# Patient Record
Sex: MALE | Race: WHITE | NOT HISPANIC OR LATINO | Employment: UNEMPLOYED | URBAN - NONMETROPOLITAN AREA
[De-identification: names, ages, dates, MRNs, and addresses within clinical notes are randomized per-mention and may not be internally consistent; named-entity substitution may affect disease eponyms.]

---

## 2019-11-15 ENCOUNTER — HOSPITAL ENCOUNTER (INPATIENT)
Facility: HOSPITAL | Age: 29
LOS: 1 days | Discharge: HOME | DRG: 885 | End: 2019-11-16
Attending: PHYSICIAN ASSISTANT | Admitting: PSYCHIATRY & NEUROLOGY
Payer: COMMERCIAL

## 2019-11-15 VITALS
RESPIRATION RATE: 16 BRPM | WEIGHT: 180 LBS | TEMPERATURE: 99.3 F | SYSTOLIC BLOOD PRESSURE: 152 MMHG | OXYGEN SATURATION: 99 % | DIASTOLIC BLOOD PRESSURE: 97 MMHG | BODY MASS INDEX: 23.75 KG/M2

## 2019-11-15 DIAGNOSIS — F32.A DEPRESSION WITH SUICIDAL IDEATION: ICD-10-CM

## 2019-11-15 DIAGNOSIS — F43.10 PTSD (POST-TRAUMATIC STRESS DISORDER): Primary | ICD-10-CM

## 2019-11-15 DIAGNOSIS — R45.851 SUICIDAL IDEATION: ICD-10-CM

## 2019-11-15 DIAGNOSIS — R45.851 DEPRESSION WITH SUICIDAL IDEATION: ICD-10-CM

## 2019-11-15 LAB
ALBUMIN SERPL-MCNC: 4.6 G/DL (ref 3.4–5)
ALP SERPL-CCNC: 84 U/L (ref 40–150)
ALT SERPL W P-5'-P-CCNC: 13 U/L (ref 0–70)
AMPHETAMINES UR QL: NOT DETECTED NG/ML
ANION GAP SERPL CALCULATED.3IONS-SCNC: 6 MMOL/L (ref 3–14)
APAP SERPL-MCNC: <2 MG/L (ref 10–20)
APTT PPP: 31 SEC (ref 22–37)
AST SERPL W P-5'-P-CCNC: 16 U/L (ref 0–45)
BARBITURATES UR QL SCN: NOT DETECTED NG/ML
BASOPHILS # BLD AUTO: 0 10E9/L (ref 0–0.2)
BASOPHILS NFR BLD AUTO: 0.2 %
BENZODIAZ UR QL SCN: NOT DETECTED NG/ML
BILIRUB SERPL-MCNC: 0.7 MG/DL (ref 0.2–1.3)
BUN SERPL-MCNC: 12 MG/DL (ref 7–30)
BUPRENORPHINE UR QL: NOT DETECTED NG/ML
CALCIUM SERPL-MCNC: 9.1 MG/DL (ref 8.5–10.1)
CANNABINOIDS UR QL: ABNORMAL NG/ML
CHLORIDE SERPL-SCNC: 106 MMOL/L (ref 94–109)
CO2 SERPL-SCNC: 26 MMOL/L (ref 20–32)
COCAINE UR QL SCN: NOT DETECTED NG/ML
CREAT SERPL-MCNC: 0.75 MG/DL (ref 0.66–1.25)
D-METHAMPHET UR QL: NOT DETECTED NG/ML
DIFFERENTIAL METHOD BLD: NORMAL
EOSINOPHIL # BLD AUTO: 0.1 10E9/L (ref 0–0.7)
EOSINOPHIL NFR BLD AUTO: 0.6 %
ERYTHROCYTE [DISTWIDTH] IN BLOOD BY AUTOMATED COUNT: 11.6 % (ref 10–15)
ETHANOL SERPL-MCNC: <0.01 G/DL
GFR SERPL CREATININE-BSD FRML MDRD: >90 ML/MIN/{1.73_M2}
GLUCOSE SERPL-MCNC: 84 MG/DL (ref 70–99)
HCT VFR BLD AUTO: 48.3 % (ref 40–53)
HGB BLD-MCNC: 16.9 G/DL (ref 13.3–17.7)
IMM GRANULOCYTES # BLD: 0 10E9/L (ref 0–0.4)
IMM GRANULOCYTES NFR BLD: 0.4 %
INR PPP: 1.12 (ref 0.86–1.14)
LYMPHOCYTES # BLD AUTO: 2.2 10E9/L (ref 0.8–5.3)
LYMPHOCYTES NFR BLD AUTO: 26.3 %
MCH RBC QN AUTO: 30.8 PG (ref 26.5–33)
MCHC RBC AUTO-ENTMCNC: 35 G/DL (ref 31.5–36.5)
MCV RBC AUTO: 88 FL (ref 78–100)
METHADONE UR QL SCN: NOT DETECTED NG/ML
MONOCYTES # BLD AUTO: 0.5 10E9/L (ref 0–1.3)
MONOCYTES NFR BLD AUTO: 6.3 %
NEUTROPHILS # BLD AUTO: 5.4 10E9/L (ref 1.6–8.3)
NEUTROPHILS NFR BLD AUTO: 66.2 %
NRBC # BLD AUTO: 0 10*3/UL
NRBC BLD AUTO-RTO: 0 /100
OPIATES UR QL SCN: NOT DETECTED NG/ML
OXYCODONE UR QL SCN: NOT DETECTED NG/ML
PCP UR QL SCN: NOT DETECTED NG/ML
PLATELET # BLD AUTO: 166 10E9/L (ref 150–450)
POTASSIUM SERPL-SCNC: 3.5 MMOL/L (ref 3.4–5.3)
PROPOXYPH UR QL: NOT DETECTED NG/ML
PROT SERPL-MCNC: 7.9 G/DL (ref 6.8–8.8)
RBC # BLD AUTO: 5.49 10E12/L (ref 4.4–5.9)
SALICYLATES SERPL-MCNC: 2 MG/DL
SODIUM SERPL-SCNC: 138 MMOL/L (ref 133–144)
TRICYCLICS UR QL SCN: NOT DETECTED NG/ML
TSH SERPL DL<=0.005 MIU/L-ACNC: 0.95 MU/L (ref 0.4–4)
WBC # BLD AUTO: 8.2 10E9/L (ref 4–11)

## 2019-11-15 PROCEDURE — 99285 EMERGENCY DEPT VISIT HI MDM: CPT | Mod: Z6 | Performed by: EMERGENCY MEDICINE

## 2019-11-15 PROCEDURE — 99285 EMERGENCY DEPT VISIT HI MDM: CPT

## 2019-11-15 PROCEDURE — 80306 DRUG TEST PRSMV INSTRMNT: CPT | Performed by: EMERGENCY MEDICINE

## 2019-11-15 PROCEDURE — 85730 THROMBOPLASTIN TIME PARTIAL: CPT | Performed by: EMERGENCY MEDICINE

## 2019-11-15 PROCEDURE — 85025 COMPLETE CBC W/AUTO DIFF WBC: CPT | Performed by: EMERGENCY MEDICINE

## 2019-11-15 PROCEDURE — 80320 DRUG SCREEN QUANTALCOHOLS: CPT | Performed by: EMERGENCY MEDICINE

## 2019-11-15 PROCEDURE — 36415 COLL VENOUS BLD VENIPUNCTURE: CPT | Performed by: EMERGENCY MEDICINE

## 2019-11-15 PROCEDURE — 80053 COMPREHEN METABOLIC PANEL: CPT | Performed by: EMERGENCY MEDICINE

## 2019-11-15 PROCEDURE — 80329 ANALGESICS NON-OPIOID 1 OR 2: CPT | Performed by: EMERGENCY MEDICINE

## 2019-11-15 PROCEDURE — 12400000 ZZH R&B MH

## 2019-11-15 PROCEDURE — 25000132 ZZH RX MED GY IP 250 OP 250 PS 637: Performed by: NURSE PRACTITIONER

## 2019-11-15 PROCEDURE — 85610 PROTHROMBIN TIME: CPT | Performed by: EMERGENCY MEDICINE

## 2019-11-15 PROCEDURE — 84443 ASSAY THYROID STIM HORMONE: CPT | Performed by: EMERGENCY MEDICINE

## 2019-11-15 RX ORDER — ALUMINA, MAGNESIA, AND SIMETHICONE 2400; 2400; 240 MG/30ML; MG/30ML; MG/30ML
30 SUSPENSION ORAL EVERY 4 HOURS PRN
Status: DISCONTINUED | OUTPATIENT
Start: 2019-11-15 | End: 2019-11-16 | Stop reason: HOSPADM

## 2019-11-15 RX ORDER — ACETAMINOPHEN 325 MG/1
650 TABLET ORAL EVERY 4 HOURS PRN
Status: DISCONTINUED | OUTPATIENT
Start: 2019-11-15 | End: 2019-11-16 | Stop reason: HOSPADM

## 2019-11-15 RX ORDER — HYDROXYZINE HYDROCHLORIDE 10 MG/1
30-50 TABLET, FILM COATED ORAL EVERY 4 HOURS PRN
Status: DISCONTINUED | OUTPATIENT
Start: 2019-11-15 | End: 2019-11-16 | Stop reason: HOSPADM

## 2019-11-15 RX ORDER — NICOTINE 21 MG/24HR
1 PATCH, TRANSDERMAL 24 HOURS TRANSDERMAL DAILY
Status: DISCONTINUED | OUTPATIENT
Start: 2019-11-15 | End: 2019-11-16 | Stop reason: HOSPADM

## 2019-11-15 RX ORDER — LORAZEPAM 1 MG/1
1 TABLET ORAL ONCE
Status: DISCONTINUED | OUTPATIENT
Start: 2019-11-15 | End: 2019-11-16 | Stop reason: HOSPADM

## 2019-11-15 RX ORDER — TRAZODONE HYDROCHLORIDE 50 MG/1
50 TABLET, FILM COATED ORAL
Status: DISCONTINUED | OUTPATIENT
Start: 2019-11-15 | End: 2019-11-16 | Stop reason: HOSPADM

## 2019-11-15 RX ADMIN — NICOTINE POLACRILEX 4 MG: 2 GUM, CHEWING ORAL at 21:34

## 2019-11-15 RX ADMIN — NICOTINE 1 PATCH: 21 PATCH, EXTENDED RELEASE TRANSDERMAL at 17:56

## 2019-11-15 RX ADMIN — HYDROXYZINE HYDROCHLORIDE 50 MG: 10 TABLET ORAL at 21:34

## 2019-11-15 RX ADMIN — NICOTINE POLACRILEX 4 MG: 2 GUM, CHEWING ORAL at 17:56

## 2019-11-15 RX ADMIN — TRAZODONE HYDROCHLORIDE 50 MG: 50 TABLET ORAL at 21:34

## 2019-11-15 ASSESSMENT — ENCOUNTER SYMPTOMS
EYES NEGATIVE: 1
NEUROLOGICAL NEGATIVE: 1
ENDOCRINE NEGATIVE: 1
CARDIOVASCULAR NEGATIVE: 1
CONSTITUTIONAL NEGATIVE: 1
HEMATOLOGIC/LYMPHATIC NEGATIVE: 1
SLEEP DISTURBANCE: 0
GASTROINTESTINAL NEGATIVE: 1
MUSCULOSKELETAL NEGATIVE: 1
RESPIRATORY NEGATIVE: 1

## 2019-11-15 NOTE — ED NOTES
"[facility] with reports of being [facility]. Patient refusing to answer majority of this writers questions - speaks pressured. When asked \"do you have a plan?\" patient replies \"that's a pretty open ended question. And I'm not answering it.\" Per [facility], patient reported he was feeling suicidal - had a plan to shoot himself with a gun. Patient does have guns at home. Patient reports a hx of depression in 2014 \"but I pulled myself out of it.\" Patient reports he \"hates his job\" \"swore off doctors\" and is \"afraid I'm going to get my 2nd child taken away from me.\" Does states his feelings of being down, depressed or hopeless began 2-3 weeks ago.   Patient refused to change. Searched by . Patient placed on a [place] by HPD Officer Anival. Patient uncooperative. Patient stood in room and went to corner when told a UA sample was needed - stating \"no way, I can't lose my job.\" Patient did give some belongings including cigarettes and key to be placed in lockers. Patient states \"next time I feel this way, I know not to go for help. [facility] and instead they're gonna lock me up on the 5th floor.\" Admits to THC - denies other illicit drugs.  HPD and Security outside of room. Security on 1:1  "

## 2019-11-15 NOTE — ED PROVIDER NOTES
History     Chief Complaint   Patient presents with     Psychiatric Evaluation     HPI  Fer Stanton is a 29 year old male who presents today with complaints of suicidal ideation.  [facility] in stating that he wanted to harm himself.  Patient stated he also had guns at home that he could use.  The [facility] called [facility] who brought him here to the emergency room.  Patient has otherwise been in his usual state of health.  Patient currently denying any suicidal ideations. Now states it was a bad idea to go to the [facility]. Patient denies overdose    Allergies:  Allergies   Allergen Reactions     Other [Seasonal Allergies]      Nohemy       Problem List:    There are no active problems to display for this patient.       Past Medical History:    No past medical history on file.    Past Surgical History:    No past surgical history on file.    Family History:    No family history on file.    Social History:  Marital Status:  Single [1]  Social History     Tobacco Use     Smoking status: Current Every Day Smoker     Packs/day: 1.00     Years: 10.00     Pack years: 10.00     Smokeless tobacco: Never Used   Substance Use Topics     Alcohol use: No     Drug use: No        Medications:    No current outpatient medications on file.        Review of Systems   Constitutional: Negative.    HENT: Negative.    Eyes: Negative.    Respiratory: Negative.    Cardiovascular: Negative.    Gastrointestinal: Negative.    Endocrine: Negative.    Genitourinary: Negative.    Musculoskeletal: Negative.    Neurological: Negative.    Hematological: Negative.    Psychiatric/Behavioral: Positive for suicidal ideas. Negative for self-injury and sleep disturbance.       Physical Exam   BP: 154/100  Heart Rate: 100  Temp: 98.2  F (36.8  C)  Resp: 18  Weight: 81.6 kg (180 lb)  SpO2: 100 %      Physical Exam  Constitutional:       Appearance: Normal appearance. He is normal weight.   HENT:      Head:  Normocephalic and atraumatic.      Mouth/Throat:      Mouth: Mucous membranes are moist.      Pharynx: Oropharynx is clear.   Eyes:      Extraocular Movements: Extraocular movements intact.      Conjunctiva/sclera: Conjunctivae normal.   Neck:      Musculoskeletal: Normal range of motion and neck supple. No neck rigidity.   Cardiovascular:      Rate and Rhythm: Normal rate and regular rhythm.   Pulmonary:      Effort: Pulmonary effort is normal.      Breath sounds: Normal breath sounds.   Abdominal:      General: Abdomen is flat. Bowel sounds are normal.      Palpations: Abdomen is soft.      Tenderness: There is no abdominal tenderness. There is no guarding.   Musculoskeletal: Normal range of motion.   Skin:     General: Skin is warm.   Neurological:      General: No focal deficit present.      Mental Status: He is alert.   Psychiatric:         Behavior: Behavior normal.         Thought Content: Thought content normal.      Comments: Patient angry and pacing about the room         ED Course     ED Course as of Nov 15 1355   Fri Nov 15, 2019   1351 Central intake called. Bed held. Awaiting medical clearance                 No results found for this or any previous visit (from the past 24 hour(s)).    Medications - No data to display    Assessments & Plan (with Medical Decision Making)         New Prescriptions    No medications on file       Final diagnoses:   Suicidal ideation       11/15/2019   [facility]     René Lazcano MD  11/27/19 2955

## 2019-11-15 NOTE — ED NOTES
[facility] to [facility]. Given information that patient prefers to stay at this facility. Approval given for patient to be admitted to our  facility.

## 2019-11-15 NOTE — ED NOTES
"HPD remains present. Security remains on 1:1. Leaning against wall, refused to sit on bed. Denies wanting to eat. States \"you're keeping me here against my will. I'm detained. I want to smoke or vape.\"  "

## 2019-11-15 NOTE — PROGRESS NOTES
11/15/19 1746   Patient Belongings   Did you bring any home meds/supplements to the hospital?  No   Patient Belongings locker;sent to security per site process   Patient Belongings Put in Hospital Secure Location (Security or Locker, etc.) cash/credit card;cell phone/electronics;wallet;keys   Belongings Search Yes   Clothing Search Yes   Second Staff April   Comment Boots, green shirt, pink hat, black hoodie, black socks, jeans, black jacket, vape, vape juice, four cigarettes, two lighters   List items sent to safe: [employer] cell phone, one car key, [employer] bank card, [employer] mastercard, visa, ced visa, [employer] ID, ISN ID, delta card, permit to carry card, social security card,  certificate card, [place] drivers license, [place] ID, $210 (10- $20's, 1-$5, 5-$1's)      11/15/2019- Pt sent key with mom      All other belongings put in assigned cubby in belongings room.       I have reviewed my belongings list on admission and verify that it is correct.     Patient signature_______________________________    Second staff witness (if patient unable to sign) ______________________________       I have received all my belongings at discharge.    Patient signature________________________________    Aimee DANIELLE  11/15/2019  5:49 PM

## 2019-11-15 NOTE — ED NOTES
"Patient pacing in room, pressured speech continues. Patient refusing Ativan stating \"I have a drug problem. I refuse benzos. Would you give a recovering alcoholic a shot?\"   Security remains on 1:1, HPD remains present.  "

## 2019-11-16 PROCEDURE — 25000132 ZZH RX MED GY IP 250 OP 250 PS 637: Performed by: NURSE PRACTITIONER

## 2019-11-16 PROCEDURE — 99236 HOSP IP/OBS SAME DATE HI 85: CPT | Performed by: NURSE PRACTITIONER

## 2019-11-16 RX ORDER — PRAZOSIN HYDROCHLORIDE 1 MG/1
1 CAPSULE ORAL AT BEDTIME
Qty: 30 CAPSULE | Refills: 0 | Status: SHIPPED | OUTPATIENT
Start: 2019-11-16 | End: 2022-12-09

## 2019-11-16 RX ORDER — ESCITALOPRAM OXALATE 10 MG/1
10 TABLET ORAL DAILY
Qty: 30 TABLET | Refills: 0 | Status: SHIPPED | OUTPATIENT
Start: 2019-11-16 | End: 2022-12-09

## 2019-11-16 RX ADMIN — NICOTINE POLACRILEX 4 MG: 2 GUM, CHEWING ORAL at 08:19

## 2019-11-16 RX ADMIN — NICOTINE 1 PATCH: 21 PATCH, EXTENDED RELEASE TRANSDERMAL at 08:17

## 2019-11-16 RX ADMIN — NICOTINE POLACRILEX 4 MG: 2 GUM, CHEWING ORAL at 10:08

## 2019-11-16 RX ADMIN — NICOTINE POLACRILEX 4 MG: 2 GUM, CHEWING ORAL at 12:23

## 2019-11-16 ASSESSMENT — ACTIVITIES OF DAILY LIVING (ADL)
FALL_HISTORY_WITHIN_LAST_SIX_MONTHS: NO
AMBULATION: 0-->INDEPENDENT
RETIRED_COMMUNICATION: 0-->UNDERSTANDS/COMMUNICATES WITHOUT DIFFICULTY
RETIRED_EATING: 0-->INDEPENDENT
TOILETING: 0-->INDEPENDENT
HYGIENE/GROOMING: INDEPENDENT
ORAL_HYGIENE: INDEPENDENT
COGNITION: 0 - NO COGNITION ISSUES REPORTED
DRESS: SCRUBS (BEHAVIORAL HEALTH)
TRANSFERRING: 0-->INDEPENDENT
BATHING: 0-->INDEPENDENT
SWALLOWING: 0-->SWALLOWS FOODS/LIQUIDS WITHOUT DIFFICULTY
LAUNDRY: UNABLE TO COMPLETE
DRESS: 0-->INDEPENDENT
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
## 2019-11-16 NOTE — PLAN OF CARE
"Problem: Adult Behavioral Health Plan of Care  Goal: Patient-Specific Goal (Individualization)  Description  Patient will complete ADL's without prompts throughout hospital stay.   Patient will attend 50 % of offered groups each shift.   Patient will follow recommendation of treatment team.   Outcome: No Change  Patient denies SI, HI, miller., and pain. He contracts for safety. He is very intense and irritable when talking with this writer. Patient makes sarcastic comments when answering this writer's questions. He states \"that's why I'm here\" when asked about anxiety and depression, but will not elaborate. He states that he would like to talk with the nurse practitioner because he wants to leave.   1145: Patient signed CONSUELO [facility] [place]  1430: Patient called asking to talk with the NP regarding medications that were prescribed and the VA insurance not covering them. He states that he would like to know if the insurance is supposed to cover them because he is not going to pick them up unless they are covered. This writer notified provider who stated that the lexapro is on the $4 list at [facility] and that we strongly suggest patient pick this up at least. Message left for patient to call unit back, as he did not answer his phone.   1535: Patient called unit back. This writer spoke with him and told him what NP had said. Patient states that he is going to decline picking up the medications because \"I served my country and gave a part of myself and they said they would cover my medical care. I am not paying for it because I am morally opposed to it. If they call me and say they will cover it then I will pick it up, but I am not going to until then\". Attempted to notify NP, but unable to get a hold of her at this time. Will pass on to next shift.  Problem: Suicide Risk  Goal: Absence of Self-Harm  Description  Patient will report an increase in mood by discharge from hospital.  Patient will " be free of self harm throughout hospitalization.    Outcome: No Change   Patient denies SI this morning and contracts for safety. He has not had any SIB so far this shift. Will continue to monitor.

## 2019-11-16 NOTE — H&P
Psychiatric Eval/H&P and Discharge Summary  Patient Name: Fer Stanton   YOB: 1990  Age: 29 year old  [id number]    Primary Physician: [facility]   Completed By: Emory Solitario NP     Fer Stanton MRN# [id number]   Age: 29 year old YOB: 1990     Date of Admission:  11/15/2019  Date of Discharge:  11/16/2019  Admitting Physician:  Noah Delgadillo MD  Discharge Provider:  Emory Solitario NP     CC:  Suicidal ideation    HPI  Fer Stanton is a 29 year old  male who presented via [facility] ED after [facility] was [facility] by [facility] when he presented with reports of suicidal ideation. According to the ED report, [facility] when he walked in stating he wanted to harm himself and had guns to do so. The [facility] then called the police who brought him to the ED. He denied suicidal ideation and intent while in the ED. No DECC was completed. Admitted on a health officer hold.    Today Fer was fairly upset about his admission. He expressed a lack of trust in the medical community secondary to repeated failures to actually listen to what he is saying. In 2014 Fer's significant other became pregnant, left the state and gave the baby up for adoption without his knowledge. He is still struggling with this, reporting nightmares nightly. 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 He would not give details. He believes this has contributed to his increasing mental health symptoms. He did [facility] through the [facility]; however, it was via video conference and he found this impersonal, so he stopped going. Also tried Wellbutrin for a period of 5 months with no benefits. Fer indicated that he repeatedly said it wasn't helping but they kept increasing it and telling him to give it more time. He has never been on any other medication.     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 In the [employer] he tore his achilles tendon but was told nothing was broke, put in a soft cast and discharged with no further instructions. He struggled to ambulate for nearly a year prior to having an MRI that showed his tendon torn 75% of the way through and being held together by scar tissue. 2014 is the last time he came in to see a doctor, outside of the [facility], for anything other than his annual check-ups required to maintain his benefits. At that time he presented to the ED with what sounds like an infected preauricular sinus. He sometimes has some build up and typically just squeezed it out, but that time it was too painful. The ED refused to drain it and put him on an oral antibiotic. He returned a couple days later as the abscess had grown significantly in size. They still refused to drain it and gave him a dose of IV antibiotics. The following morning he presented to his place of employment and ended up needed to see the medic for symptoms of sepsis. The area was drained and his symptoms resolved.     Fer described his situation a little differently than the [facility] and ED report. He admitted that he went in reporting suicidal ideation. He described knowing that he needed help because he has always had a significant fear of pain. Recently he began thinking that some of his emotional symptoms were painful enough that the thought of physical pain was no longer scary to him. He expressed knowing that if he ended his life, all of his problems would be solved. When he explained this to the nurse at the [facility], expecting that he would receive some counseling, maybe medications to help, referrals for more supportive services, she responded by asking if he had guns at home, which, of course as a [employer], he does. She then called the police. Fer denied any intent to harm himself and any desire " "to die. He stated, \"if I wanted to die, why would I go in and ask for help?\" He feels that he was \"locked up\" just because he is a vet. \"22 suicides a day,\" he said, specifying that is why he knew his train of thought was wrong and he needed some help. He is now regretting his decision and again feels like the system is failing him.     Although very resistant at first, stating that he believed if he confided anything, he would remain locked up, he did relax some and was open and forthcoming. Expressed excessive fear about his current girlfriend leaving him. She is three months pregnant with his child. She said she is going to stay, and he feels he should trust her, so he has not burdened her with his fears or feelings. He repeatedly talks about needing her to stay happy so that she does not leave, or giving her \"everything she wants,\" so that she is happy. His nightmares have increased since news of the pregnancy; however, he still sleeps fairly well. Appetite is \"ok,\" but possibly less than it should be. No history of psychotic symptoms. No history of ko. When asked about depression, he stated that he is unclear about that \"feeling,\" but \"if I'm having thoughts about dying, I must be depressed.\" He does struggle with ruminative thoughts and what sounds like a lot of over thinking.     History of a difficult childhood. He didn't go into a lot of specific details but talked about his dad not being around, abut therapy at the ages of 5 and 8 related to his parent's divorce and father's abandonment. He also worked with a therapist prior to his diagnosis of abdominal epilepsy. He spent some of his adolescence in a juvenile [facility] and rehab at the age of 17.      [facility]     Past Psychiatric History:   No history of inpatient mental health stays outside of one at the age of 16-17 when he was awaiting court ordered placement in rehab. No current outpatient services. Therapy as a child and briefly as an adult. " Previously tried Wellbutrin but no other medications.      Social History:   Grew up in [place] but now resides in [place] with is significant other of 3 months. She is pregnant. Relocated her in 2013 after his mom did. Parents  when he was 5, and he has had no involvement with his dad.  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 He is currently working on new employment.  Legal history as a teen involving drug charges and incarceration when he was kicked out of court ordered treatment. No current legal issues.     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 Does use THC. CD treatment when he was 17 but he did not complete this.      Family Psychiatric History:   He was unsure; however, his mother indicated she has struggled with depression and done well on Lexapro and Wellbutrin.       MSE/PSYCH  PSYCHIATRIC EXAM  /97   Temp 99.3  F (37.4  C) (Tympanic)   Resp 16   Wt 81.6 kg (180 lb)   SpO2 99%   BMI 23.75 kg/m    -Appearance/Behavior:  Awake, alert, initially distressed  Attitude: Improved as we talked  -Motor: normal or unremarkable.  -Gait: Normal.    -Abnormal involuntary movements: None noted.  -Mood: depressed and anxious.  -Affect: Appropriate/mood-congruent.  -Speech: Normal volume, rate and content.                 -Thought process/associations: Logical, Linear and Goal directed.  -Thought content: normal .  -Perceptual disturbances: No hallucinations..              -Suicidal/Homicidal Ideation: Denied  -Judgment: Good.  -Insight: Good.  *Orientation: time, place and person.  *Memory: Intact.  *Attention: Good  *Language: fluent, no aphasias, able to repeat phrases and name objects. Vocab intact.  *Fund of information: appropriate for education.  *Cognitive functioning estimate: average.         Medical Review of Systems:   Medical History and ROS  Prior to Admission medications    Medication Sig Start Date End Date Taking?  Authorizing Provider   escitalopram (LEXAPRO) 10 MG tablet Take 1 tablet (10 mg) by mouth daily 11/16/19  Yes Emory Solitario, NP   prazosin (MINIPRESS) 1 MG capsule Take 1 capsule (1 mg) by mouth At Bedtime 11/16/19  Yes Emory Solitario NP     Allergies   Allergen Reactions     Other [Seasonal Allergies]      Nohemy     No past medical history on file.  No past surgical history on file.    Physical Exam as completed by Dr. Lazcano in the ED on 11/15. Reviewed with no noted discrepancies outside of psychiatric presentation (info in exam above)  Constitutional:       Appearance: Normal appearance. He is normal weight.   HENT:      Head: Normocephalic and atraumatic.      Mouth/Throat:      Mouth: Mucous membranes are moist.      Pharynx: Oropharynx is clear.   Eyes:      Extraocular Movements: Extraocular movements intact.      Conjunctiva/sclera: Conjunctivae normal.   Neck:      Musculoskeletal: Normal range of motion and neck supple. No neck rigidity.   Cardiovascular:      Rate and Rhythm: Normal rate and regular rhythm.   Pulmonary:      Effort: Pulmonary effort is normal.      Breath sounds: Normal breath sounds.   Abdominal:      General: Abdomen is flat. Bowel sounds are normal.      Palpations: Abdomen is soft.      Tenderness: There is no abdominal tenderness. There is no guarding.   Musculoskeletal: Normal range of motion.   Skin:     General: Skin is warm.   Neurological:      General: No focal deficit present.      Mental Status: He is alert.     Review of Systems:  Constitution: No weight loss, fever, night sweats  Skin: No rashes, pruritus or open wounds  Neuro: No headaches or seizure activity.  Psych:  See HPI  Eyes: No vision changes.  ENT: No problems chewing or swallowing.   Musculoskeletal: No muscle pain, joint pain or swelling   Respiratory: No cough or dyspnea  Cardiovascular:  No chest pain,  palpitations or fainting  Gastrointestinal:  No abdominal pain, nausea, vomiting or change in bowel  habits    Labs:   Results for orders placed or performed during the hospital encounter of 11/15/19   Urine Drugs of Abuse Screen Panel 13     Status: Abnormal   Result Value Ref Range    Cannabinoids (15-fzi-6-carboxy-9-THC) Detected, Abnormal Result (A) NDET^Not Detected ng/mL    Phencyclidine (Phencyclidine) Not Detected NDET^Not Detected ng/mL    Cocaine (Benzoylecgonine) Not Detected NDET^Not Detected ng/mL    Methamphetamine (d-Methamphetamine) Not Detected NDET^Not Detected ng/mL    Opiates (Morphine) Not Detected NDET^Not Detected ng/mL    Amphetamine (d-Amphetamine) Not Detected NDET^Not Detected ng/mL    Benzodiazepines (Nordiazepam) Not Detected NDET^Not Detected ng/mL    Tricyclic Antidepressants (Desipramine) Not Detected NDET^Not Detected ng/mL    Methadone (Methadone) Not Detected NDET^Not Detected ng/mL    Barbiturates (Butalbital) Not Detected NDET^Not Detected ng/mL    Oxycodone (Oxycodone) Not Detected NDET^Not Detected ng/mL    Propoxyphene (Norpropoxyphene) Not Detected NDET^Not Detected ng/mL    Buprenorphine (Buprenorphine) Not Detected NDET^Not Detected ng/mL   Acetaminophen level     Status: None   Result Value Ref Range    Acetaminophen Level <2 mg/L   Alcohol ethyl     Status: None   Result Value Ref Range    Ethanol g/dL <0.01 0.01 g/dL   CBC with platelets differential     Status: None   Result Value Ref Range    WBC 8.2 4.0 - 11.0 10e9/L    RBC Count 5.49 4.4 - 5.9 10e12/L    Hemoglobin 16.9 13.3 - 17.7 g/dL    Hematocrit 48.3 40.0 - 53.0 %    MCV 88 78 - 100 fl    MCH 30.8 26.5 - 33.0 pg    MCHC 35.0 31.5 - 36.5 g/dL    RDW 11.6 10.0 - 15.0 %    Platelet Count 166 150 - 450 10e9/L    Diff Method Automated Method     % Neutrophils 66.2 %    % Lymphocytes 26.3 %    % Monocytes 6.3 %    % Eosinophils 0.6 %    % Basophils 0.2 %    % Immature Granulocytes 0.4 %    Nucleated RBCs 0 0 /100    Absolute Neutrophil 5.4 1.6 - 8.3 10e9/L    Absolute Lymphocytes 2.2 0.8 - 5.3 10e9/L    Absolute  Monocytes 0.5 0.0 - 1.3 10e9/L    Absolute Eosinophils 0.1 0.0 - 0.7 10e9/L    Absolute Basophils 0.0 0.0 - 0.2 10e9/L    Abs Immature Granulocytes 0.0 0 - 0.4 10e9/L    Absolute Nucleated RBC 0.0    Comprehensive metabolic panel     Status: None   Result Value Ref Range    Sodium 138 133 - 144 mmol/L    Potassium 3.5 3.4 - 5.3 mmol/L    Chloride 106 94 - 109 mmol/L    Carbon Dioxide 26 20 - 32 mmol/L    Anion Gap 6 3 - 14 mmol/L    Glucose 84 70 - 99 mg/dL    Urea Nitrogen 12 7 - 30 mg/dL    Creatinine 0.75 0.66 - 1.25 mg/dL    GFR Estimate >90 >60 mL/min/[1.73_m2]    GFR Estimate If Black >90 >60 mL/min/[1.73_m2]    Calcium 9.1 8.5 - 10.1 mg/dL    Bilirubin Total 0.7 0.2 - 1.3 mg/dL    Albumin 4.6 3.4 - 5.0 g/dL    Protein Total 7.9 6.8 - 8.8 g/dL    Alkaline Phosphatase 84 40 - 150 U/L    ALT 13 0 - 70 U/L    AST 16 0 - 45 U/L   INR     Status: None   Result Value Ref Range    INR 1.12 0.86 - 1.14   Partial thromboplastin time     Status: None   Result Value Ref Range    PTT 31 22 - 37 sec   Salicylate level     Status: None   Result Value Ref Range    Salicylate Level 2 mg/dL   TSH     Status: None   Result Value Ref Range    TSH 0.95 0.40 - 4.00 mU/L       Assessment/Impression: This is a 29 year old yo male with increasing symptoms of depression and anxiety marked by recent onset of suicidal thoughts. Denies intent or any history of attempts or inpatient mental health hospitalizations related to such. As documented above, it sounds like [facility] the [facility], mentioned these thoughts, and confirmed access to firearms, there was some panic and it was decided he needed to be admitted. I spoke with his mother for some time who agrees that he is safe to discharge home with services and medications. She was also upset that her son sought help and again the system failed him by not listening to what he was saying or setting him up with appropriate services. He did agree to start on Lexapro, which his mother did very  well on, minipress at bed for the nightmares and anxiety, and be set up with some therapy. As [facility] doctors at the [facility] and cannot afford services outside of this, a release was signed so this provider could contact them and request approval for individualized therapy outside of the [facility]. They do not provide in-person services, which he is in need of. He agreed to schedule an outpatient appointment to address his medications at the [facility] as well. Both he and his mother agreed she would go to his home to collect his firearms prior to discharge. She expressed she was on her way to do this yesterday when she was sidelined by news of his hospitalization. She will pick Fer up.    Educated regarding medication indications, risks, benefits, side effects, contraindications and possible interactions. Verbally expressed understanding.     DX:  Major Depressive Disorder, recurrent, moderate to severe  Generalized Anxiety Disorder  PTSD         Discharge Medications:     Current Discharge Medication List      START taking these medications    Details   escitalopram (LEXAPRO) 10 MG tablet Take 1 tablet (10 mg) by mouth daily  Qty: 30 tablet, Refills: 0    Associated Diagnoses: Depression with suicidal ideation      prazosin (MINIPRESS) 1 MG capsule Take 1 capsule (1 mg) by mouth At Bedtime  Qty: 30 capsule, Refills: 0    Associated Diagnoses: PTSD (post-traumatic stress disorder)                  Discharge Plan:     Discharge home with significant other. Mother to . Medication orders sent to [facility] in [place].     Schedule follow-up for medication management at the [facility] as soon as possible.     Lexapro 10mg daily ordered  Prazosin 1mg at bedtime ordered    Provider will contact the [facility] on Monday to inquire about patient's ability to obtain therapy services outside of their clinic.    Mother to remove firearms from home.    Attestation:  The patient has been seen and evaluated by me,  Emory Solitario, NP      VERONIKA Feldman,  CNP

## 2019-11-16 NOTE — PLAN OF CARE
Discharge Note    Patient Discharged to home on 11/16/2019 2:06 PM via Private Car accompanied by floor staff and his mother.     Patient informed of discharge instructions in AVS. patient verbalizes understanding and denies having any questions pertaining to AVS. Patient stable at time of discharge. Patient denies SI, HI, and thoughts of self harm at time of discharge. All personal belongings returned to patient. Discharge prescriptions sent to [facility] in [place] via electronic communication.     Laurie Herrmann RN  11/16/2019  2:07 PM

## 2019-11-16 NOTE — PLAN OF CARE
Problem: Adult Inpatient Plan of Care  Goal: Patient-Specific Goal (Individualization)  Outcome: No Change    Pt has been in bed with eyes closed and regular respirations x 7 hours this noc shift. 15 minute and PRN checks all night. No complaints offered. Will continue to monitor.      Face to face end of shift report to be communicated to oncoming RN.     Carlene Carter RN  11/16/2019

## 2019-11-20 ENCOUNTER — TELEPHONE (OUTPATIENT)
Dept: PSYCHIATRY | Facility: OTHER | Age: 29
End: 2019-11-20

## 2019-11-20 NOTE — TELEPHONE ENCOUNTER
We received a [facility] from the [facility] for patient to see psychiatry. I faxed it back to [facility] at fax # [phone] asking them to refer elsewhere and informed them we have to availability for psychiatry. Dr. Valencia Cespedes is not taking new patients and Ophelia Palmer is scheduled out to March.

## 2021-08-24 ENCOUNTER — MEDICAL CORRESPONDENCE (OUTPATIENT)
Dept: HEALTH INFORMATION MANAGEMENT | Facility: HOSPITAL | Age: 31
End: 2021-08-24

## 2021-09-15 DIAGNOSIS — Z30.2 ENCOUNTER FOR VASECTOMY: ICD-10-CM

## 2021-10-07 ENCOUNTER — ANCILLARY PROCEDURE (OUTPATIENT)
Dept: GENERAL RADIOLOGY | Facility: OTHER | Age: 31
End: 2021-10-07
Attending: PHYSICAL MEDICINE & REHABILITATION
Payer: OTHER GOVERNMENT

## 2021-10-07 ENCOUNTER — TELEPHONE (OUTPATIENT)
Dept: FAMILY MEDICINE | Facility: OTHER | Age: 31
End: 2021-10-07

## 2021-10-07 DIAGNOSIS — M13.80 ARTHRITIS, ALLERGIC: ICD-10-CM

## 2021-10-07 PROCEDURE — 73600 X-RAY EXAM OF ANKLE: CPT | Mod: TC | Performed by: RADIOLOGY

## 2022-09-22 ENCOUNTER — OFFICE VISIT (OUTPATIENT)
Dept: UROLOGY | Facility: OTHER | Age: 32
End: 2022-09-22
Attending: UROLOGY
Payer: COMMERCIAL

## 2022-09-22 VITALS
SYSTOLIC BLOOD PRESSURE: 110 MMHG | BODY MASS INDEX: 19.92 KG/M2 | DIASTOLIC BLOOD PRESSURE: 80 MMHG | WEIGHT: 151 LBS | OXYGEN SATURATION: 97 % | HEART RATE: 107 BPM | RESPIRATION RATE: 16 BRPM

## 2022-09-22 DIAGNOSIS — Z30.09 ENCOUNTER FOR VASECTOMY COUNSELING: Primary | ICD-10-CM

## 2022-09-22 PROCEDURE — 99203 OFFICE O/P NEW LOW 30 MIN: CPT | Performed by: UROLOGY

## 2022-09-22 ASSESSMENT — PAIN SCALES - GENERAL: PAINLEVEL: NO PAIN (0)

## 2022-09-22 NOTE — NURSING NOTE
Chief Complaint   Patient presents with     Consult     Vasectomy Consult        Patient presents to the clinic today for a vasectomy consult    Review of Systems:    Weight loss:    No     Recent fever/chills:  No   Night sweats:   No  Current skin rash:  No   Recent hair loss:  No  Heat intolerance:  No   Cold intolerance:  No  Chest pain:   No   Palpitations:   No  Shortness of breath:  No   Wheezing:   No  Constipation:    No   Diarrhea:   No   Nausea:   No   Vomiting:   No   Kidney/side pain:  No   Back pain:   No  Frequent headaches:  No   Dizziness:     No  Leg swelling:   No   Calf pain:    No    Parents, brothers or sisters with history of kidney cancer:   No  Parents, brothers or sisters with history of bladder cancer: No    Medication Reconciliation: completed   Akua Weston LPN  9/22/2022 8:26 AM

## 2022-09-22 NOTE — PROGRESS NOTES
Type of Visit  NPV    Chief Complaint  Elective sterilization    HPI  Mr. Stanton is a 32 year old male who presents with desire for irreversible, elective sterilization.    He has been considering this decision for years and reports a high level of certainty regarding this decision.   His wife is supportive and has been involved in making this decision.   Their main reason for choosing vasectomy over other dependably reversible methods of contraception is they perceived it as the most secure way of avoiding pregnancy and they dislike other contraceptive measures.   He does not have interest in future fertility.   He has made this decision free any coercion. He does have some anxiety/fear regarding this procedure, specifically regarding the anticipated pain during the procedure.   He is not anxious/fearful of the finality/permanence of the procedure.   He is not anxious/fearful of the potential/risk of complications.  He denies erectile dysfunction.  He denies a history of bleeding disorders or reactions to local anesthetic.      Past Medical History  He  has no past medical history on file.  Patient Active Problem List   Diagnosis     Depression with suicidal ideation       Past Surgical History  He  has no past surgical history on file.    Medications  He has a current medication list which includes the following prescription(s): escitalopram and prazosin.    Allergies  Allergies   Allergen Reactions     Other [Seasonal Allergies]      Nohemy       Social History  He  reports that he has been smoking. He has a 10.00 pack-year smoking history. He has never used smokeless tobacco. He reports that he does not drink alcohol and does not use drugs.  No drug abuse.    Family History  No family history on file.    Review of Systems  I personally reviewed the ROS with the patient.    Nursing Notes:   Akua Weston LPN  9/22/2022  8:27 AM  Signed  Chief Complaint   Patient presents with     Consult     Vasectomy Consult         Patient presents to the clinic today for a vasectomy consult    Review of Systems:    Weight loss:    No     Recent fever/chills:  No   Night sweats:   No  Current skin rash:  No   Recent hair loss:  No  Heat intolerance:  No   Cold intolerance:  No  Chest pain:   No   Palpitations:   No  Shortness of breath:  No   Wheezing:   No  Constipation:    No   Diarrhea:   No   Nausea:   No   Vomiting:   No   Kidney/side pain:  No   Back pain:   No  Frequent headaches:  No   Dizziness:     No  Leg swelling:   No   Calf pain:    No    Parents, brothers or sisters with history of kidney cancer:   No  Parents, brothers or sisters with history of bladder cancer: No    Medication Reconciliation: completed   Akua Weston LPN  9/22/2022 8:26 AM       Physical Exam  Vitals:    09/22/22 0829   BP: 110/80   BP Location: Right arm   Patient Position: Sitting   Cuff Size: Adult Regular   Pulse: 107   Resp: 16   SpO2: 97%   Weight: 68.5 kg (151 lb)     Constitutional: NAD, WDWN.   Head: NCAT  Eyes: Conjunctivae normal  Cardiovascular: Regular rate.  Pulmonary/Chest: Respirations are even and non-labored bilaterally.  Abdominal: Soft. No distension, tenderness, masses or guarding. No CVA tenderness.  Neurological: A + O x 3.  Cranial Nerves II-XII grossly intact.  Extremities: MIGUEL x 4, Warm. No clubbing.  No cyanosis.    Skin: Pink, warm and dry.  No rashes noted.  Psychiatric:  Normal mood and affect  Genitourinary:  Phallus normal without lesions, testicles descended bilaterally, no masses.    Bilateral vasa palpable    Assessment & Plan  Mr. Stanton is a 32 year old male who presents today requesting permanent, irreversible surgical sterilization.  The vasectomy procedure was discussed in detail with the patient today including the following:  - Preparation prior to the procedure  - Expectations the day of the procedure  - Risks of the procedure such as sterilization failure, infection, bleeding and/or development of chronic  testicular pain.    - Expectations and limitations during recovery    Furthermore, the patient was told he would remain fertile following the procedure until he provided a semen analysis that met the definition of infertile (no sperm present or <100,000 nonmotile sperm/mL).  This is usually planned for 3 months after the procedure.  The patient expressed understanding and desire to proceed.    Provided vasectomy hand out again outlining the above risks and benefits as well as need for using current form of birth control until follow up semen analysis meets the definition of sterility.

## 2022-10-26 ENCOUNTER — OFFICE VISIT (OUTPATIENT)
Dept: UROLOGY | Facility: OTHER | Age: 32
End: 2022-10-26
Attending: UROLOGY
Payer: COMMERCIAL

## 2022-10-26 VITALS
OXYGEN SATURATION: 97 % | RESPIRATION RATE: 16 BRPM | WEIGHT: 150 LBS | BODY MASS INDEX: 19.79 KG/M2 | HEART RATE: 97 BPM

## 2022-10-26 DIAGNOSIS — Z30.2 ENCOUNTER FOR VASECTOMY: Primary | ICD-10-CM

## 2022-10-26 PROCEDURE — 55250 REMOVAL OF SPERM DUCT(S): CPT | Performed by: UROLOGY

## 2022-10-26 ASSESSMENT — PAIN SCALES - GENERAL: PAINLEVEL: NO PAIN (0)

## 2022-10-26 NOTE — PROGRESS NOTES
Preoperative diagnosis  Elective sterilization    Postoperative diagnosis  Elective sterilization    Procedure performed  Bilateral vasectomy    Surgeon  Michele Hdz MD    Anesthesia  8 mL lidocaine 2% local injection    Complications  None    EBL  <1 mL    Specimen  None    Indications  32 year old male agreed to undergo the above named procedure after discussion of the alternatives, risks and benefits.  Informed consent was obtained.      Procedure  The patient was brought to the procedure room and placed in supine position.  His scrotum was prepped with Hibiclens and he was draped in a sterile fashion.  A timeout was performed.    Lidocaine 2% was used to anesthetize the scrotal skin as well as perivasal sheath initially on the patient's left.  A 1 cm skin incision was created with the sharp-tip mosquito and a vas clamp utilized through this incision to grasp the vas.  The left vas was elevated to the skin surface and dissected free of its perivasal sheath.  The vas was transected and the lumen was cauterized both proximally and distally.  A 4-0 chromic suture was utilized to place the proximal vasal portion under the perivasal sheath, such that the 2 ends were divided by the perivasal sheath (fascial interposition).  This portion of the vas was then allowed to drop back into the left hemiscrotum.  The right side was treated next in the same manner as described above.  The skin incision was closed with the 4-0 chromic suture.  The patient tolerated the procedure well.    Plan  The patient received postvasectomy instructions:  - apply frozen peas  - take scheduled NSAIDs   - wear jock support for the next 2 to 3 days  - abstain from vigorous activity for the next week or so  - continue current family planning/birth control until negative semen analysis is confirmed.      He is aware that he is not considered sterile until follow-up ejaculatory specimens show no evidence of sperm.    We provided instructions to  submit a semen sample in 3 months.

## 2022-10-26 NOTE — PATIENT INSTRUCTIONS
Home Care after Vasectomy   Follow these guidelines for your care after your surgery to help your recovery.     Activity   Limit your activity for the first 5 days after the procedure to light activity.   You may return to work typically in 2 days.   Limit lifting, pushing or pulling to less than 20 pounds until you are no longer sore.   Limit running and long walks until you are no longer sore.   Limit sexual activity for 5 days.     Swelling   Scrotal swelling from the surgery may take weeks to get better. You should call your doctor if the swelling is severe and the scrotum is larger than an orange.  Apply a bag of frozen peas to the scrotum for 15 minutes every 1-2 hours for the first 48 hours when you are awake to limit swelling. It works best to not apply the bag of frozen peas directly to the skin.  Wear a jock strap to support your scrotum and reduce swelling.  Continue wearing the jock strap until you are no longer sore, 1-2 weeks.     Incision care   The single stitch placed in the scrotum will dissolve and does not need to be removed.  A small amount of blood may stain the dressings for up to 2-3 days after the procedure.  For the first few days, apply two or three gauze pads to the site each day and as needed to keep the dressing dry. This will protect the incision and help keep your clothes clean.  When you are no longer having any drainage, stop using the gauze pads over the site.     Bathing or showering   You may shower after the procedure but do not scrub the stitch area.  Allow the water to wash over the stitch and do not scrub the area. Dry the site gently by patting it with a clean towel.  Tub baths should be avoided for 7 days after surgery.  Swimming should be avoided for 14 days after surgery.        Pain control   Please take OTC ibuprofen 200mg tablets as discussed in detail in clinic.  - 4 tablets (800mg) three times a day for 3 days with food to help with swelling.    Sexual activity    Please avoid ejaculating for 5 days after procedure or until soreness is resolved.     Follow up   Following this procedure you are still considered fertile and would be able to impregnate your partner.  You should have a semen analysis performed 3 months or greater after your procedure to confirm sterility.  Ideally you would ejaculate about 2-3 dozen times following the vasectomy and prior to semen collection.  Use birth control until the semen analysis is confirmed sterile.     The semen sample needs to be dropped off at Diagnostic check in desk no later than 30 minutes after collection.  In transport keep sample body temp by keeping it next to your body such as in between your legs.  You do not have to make an appointment to drop off this sample unless you need to do the collection in house.     Use contraceptives until this is confirmed to prevent pregnancy.     Contacts   General Questions: [phone]   Appointments: [phone]   Emergencies: [phone]     When to call your doctor   Call the urology office if you have any of the following:   Severe swelling, larger than the size of an orange   A large amount of fluid drainage that soaks several pads per day   Pain that is not controlled with pain medicine, jock strap and use of frozen peas   Any signs of infection such as the following:   -Redness or tenderness around the incision site worsening after 2-3 days or   -Pus type drainage from the incision or   -Fever of greater than 101 degrees F     Also call the office if you have any questions or concerns about your care.

## 2022-10-26 NOTE — NURSING NOTE
Vasectomy  Per verbal order read back by Michele Hdz MD to prep patient for vasectomy.  Patient positioned in supine position, perineum area prepped with chlorhexidene Gluconate and patient draped per sterile technique.    Lidocaine 2%  Lot #: [id number]  Expiration date: 3/1/2026  : [facility]:  [id number]    [place] Protocol    A. Pre-procedure verification complete Yes  1-relevant information / documentation available, reviewed and properly matched to the patient; 2-consent accurate and complete, 3-equipment and supplies available    B. Site marking complete N/A  Site marked if not in continuous attendance with patient    C. TIME OUT completed Yes  Time Out was conducted just prior to starting procedure to verify the eight required elements: 1-patient identity, 2-consent accurate and complete, 3-position, 4-correct side/site marked (if applicable), 5-procedure, 6-relevant images / results properly labeled and displayed (if applicable), 7-antibiotics / irrigation fluids (if applicable), 8-safety precautions.    After procedure perineum area rinsed. Semen analysis container given to patient. Patient reminded to have a semen analysis performed 3 months after the procedure to confirm sterility and to ejaculate about 1-2 dozen times following the vasectomy and prior to semen collection. Discharge instructions reviewed with patient. Patient verbalized understanding of discharge instructions and discharged ambulatory.

## 2022-12-09 ENCOUNTER — HOSPITAL ENCOUNTER (EMERGENCY)
Facility: HOSPITAL | Age: 32
Discharge: HOME | End: 2022-12-09
Attending: NURSE PRACTITIONER | Admitting: NURSE PRACTITIONER
Payer: COMMERCIAL

## 2022-12-09 VITALS
DIASTOLIC BLOOD PRESSURE: 69 MMHG | HEART RATE: 111 BPM | TEMPERATURE: 101.7 F | OXYGEN SATURATION: 97 % | RESPIRATION RATE: 18 BRPM | SYSTOLIC BLOOD PRESSURE: 111 MMHG

## 2022-12-09 DIAGNOSIS — J06.9 URI (UPPER RESPIRATORY INFECTION): ICD-10-CM

## 2022-12-09 DIAGNOSIS — J06.9 UPPER RESPIRATORY TRACT INFECTION, UNSPECIFIED TYPE: ICD-10-CM

## 2022-12-09 LAB
FLUAV RNA SPEC QL NAA+PROBE: POSITIVE
FLUBV RNA RESP QL NAA+PROBE: NEGATIVE
GROUP A STREP BY PCR: NOT DETECTED
RSV RNA SPEC NAA+PROBE: NEGATIVE
SARS-COV-2 RNA RESP QL NAA+PROBE: NEGATIVE

## 2022-12-09 PROCEDURE — 87637 SARSCOV2&INF A&B&RSV AMP PRB: CPT | Performed by: NURSE PRACTITIONER

## 2022-12-09 PROCEDURE — C9803 HOPD COVID-19 SPEC COLLECT: HCPCS

## 2022-12-09 PROCEDURE — 87651 STREP A DNA AMP PROBE: CPT | Performed by: NURSE PRACTITIONER

## 2022-12-09 PROCEDURE — G0463 HOSPITAL OUTPT CLINIC VISIT: HCPCS

## 2022-12-09 PROCEDURE — 99213 OFFICE O/P EST LOW 20 MIN: CPT | Performed by: NURSE PRACTITIONER

## 2022-12-09 ASSESSMENT — ENCOUNTER SYMPTOMS
DIZZINESS: 0
EYES NEGATIVE: 1
HEADACHES: 0
VOMITING: 0
LIGHT-HEADEDNESS: 0
DIARRHEA: 1
COUGH: 1
ACTIVITY CHANGE: 1
CHILLS: 0
NAUSEA: 0
RHINORRHEA: 0
MYALGIAS: 1
FEVER: 1
TROUBLE SWALLOWING: 1
SHORTNESS OF BREATH: 1
SORE THROAT: 1

## 2022-12-10 RX ORDER — OSELTAMIVIR PHOSPHATE 75 MG/1
75 CAPSULE ORAL 2 TIMES DAILY
Qty: 10 CAPSULE | Refills: 0 | Status: SHIPPED | OUTPATIENT
Start: 2022-12-10 | End: 2022-12-15

## 2022-12-10 NOTE — ED PROVIDER NOTES
History     Chief Complaint   Patient presents with     Cough     Fever     Chest Wall Pain     Shortness of Breath     HPI  Fer Stanton is a 32 year old male who presents with a 3-day history of fevers, nasal congestion, sore throat with painful swallowing, cough, shortness of breath, body aches, and 2 diarrhea stools in the past 24 hours.  Has taken DayQuil, Mucinex, and Sudafed this morning with moderate relief of his symptoms.  Exposed to RSV at his child's .  Non-smoker.  Has not had recent vaccination.  Denies nausea, vomiting, and headaches.    Allergies:  Allergies   Allergen Reactions     Other [Seasonal Allergies]      Nohemy       Problem List:    Patient Active Problem List    Diagnosis Date Noted     Depression with suicidal ideation 11/15/2019     Priority: Medium        Past Medical History:    History reviewed. No pertinent past medical history.    Past Surgical History:    History reviewed. No pertinent surgical history.    Family History:    History reviewed. No pertinent family history.    Social History:  Marital Status:  Single [1]  Social History     Tobacco Use     Smoking status: Every Day     Packs/day: 1.00     Years: 10.00     Pack years: 10.00     Types: Cigarettes     Smokeless tobacco: Never   Substance Use Topics     Alcohol use: No     Drug use: No        Medications:    No current outpatient medications on file.        Review of Systems   Constitutional: Positive for activity change and fever. Negative for chills.   HENT: Positive for congestion (nasal), sore throat and trouble swallowing. Negative for ear pain and rhinorrhea.    Eyes: Negative.         Burn   Respiratory: Positive for cough and shortness of breath.    Gastrointestinal: Positive for diarrhea (two loose stool in the past 24 hours). Negative for nausea and vomiting.   Musculoskeletal: Positive for myalgias.   Neurological: Negative for dizziness, light-headedness and headaches.       Physical Exam   BP:  111/69  Pulse: 111  Temp: (!) 101.7  F (38.7  C)  Resp: 18  SpO2: 97 %      Physical Exam  Vitals and nursing note reviewed.   Constitutional:       General: He is in acute distress (mild to moderate).      Appearance: He is normal weight.   HENT:      Head: Normocephalic.      Right Ear: Tympanic membrane and ear canal normal.      Left Ear: Tympanic membrane and ear canal normal.      Nose: Mucosal edema present.      Right Sinus: No maxillary sinus tenderness or frontal sinus tenderness.      Left Sinus: No maxillary sinus tenderness or frontal sinus tenderness.      Mouth/Throat:      Lips: Pink.      Mouth: Mucous membranes are moist.      Pharynx: Uvula midline. No posterior oropharyngeal erythema.   Eyes:      Conjunctiva/sclera: Conjunctivae normal.   Cardiovascular:      Rate and Rhythm: Normal rate and regular rhythm.      Heart sounds: Normal heart sounds. No murmur heard.  Pulmonary:      Effort: Pulmonary effort is normal. No respiratory distress.      Breath sounds: Normal breath sounds. No wheezing.   Lymphadenopathy:      Cervical: Cervical adenopathy (small to moderate) present.      Right cervical: Superficial cervical adenopathy present.      Left cervical: Superficial cervical adenopathy present.   Skin:     General: Skin is warm and dry.   Neurological:      Mental Status: He is alert and oriented to person, place, and time.   Psychiatric:         Behavior: Behavior normal.         ED Course                 Procedures             Results for orders placed or performed during the hospital encounter of 12/09/22 (from the past 24 hour(s))   Group A Streptococcus PCR Throat Swab    Specimen: Throat; Swab   Result Value Ref Range    Group A strep by PCR Not Detected Not Detected    Narrative    The Xpert Xpress Strep A test, performed on the [employer], is a rapid, qualitative in vitro diagnostic test for the detection of Streptococcus pyogenes (Group A ß-hemolytic Streptococcus,  Strep A) in throat swab specimens from patients with signs and symptoms of pharyngitis. The Xpert Xpress Strep A test can be used as an aid in the diagnosis of Group A Streptococcal pharyngitis. The assay is not intended to monitor treatment for Group A Streptococcus infections. The Xpert Xpress Strep A test utilizes an automated real-time polymerase chain reaction (PCR) to detect Streptococcus pyogenes DNA.       Medications - No data to display    Assessments & Plan (with Medical Decision Making)     I have reviewed the nursing notes.    I have reviewed the findings, diagnosis, plan and need for follow up with the patient.  (J06.9) URI (upper respiratory infection)  Comment:  32 year old male who presents with a 3-day history of fevers, nasal congestion, sore throat with painful swallowing, cough, shortness of breath, body aches, and 2 diarrhea stools in the past 24 hours.  Has taken DayQuil, Mucinex, and Sudafed this morning with moderate relief of his symptoms.  Exposed to RSV at his child's .  Non-smoker.  Has not had recent vaccination.  Denies nausea, vomiting, and headaches.    MDM:NHT. Lungs CTA  Strep screen negative    Plan: Education provided with viral URI.  Return to ER/urgent care for worsening of symptoms    These discharge instructions and medications were reviewed with him and understanding verbalized.    This document was prepared using a combination of typing and voice generated software.  While every attempt was made for accuracy, spelling and grammatical errors may exist.    There are no discharge medications for this patient.      Final diagnoses:   URI (upper respiratory infection)       12/9/2022   [facility]       Tayler Thomas, CNP  12/09/22 [id number]

## 2022-12-10 NOTE — ED PROVIDER NOTES
Influenza A positive.  Patient called back requesting Tamiflu.  Tamiflu 75 mg orally twice daily for 5 days.     Tayler Thomas, CNP  12/10/22 [id number]

## 2022-12-10 NOTE — ED TRIAGE NOTES
Pt presents with c/o cough fever, chest pain, body aches, runny nose, fatigue   Started two days ago.  took mucinex, dayquil, sudafed today

## 2023-03-25 ENCOUNTER — APPOINTMENT (OUTPATIENT)
Dept: LAB | Facility: HOSPITAL | Age: 33
End: 2023-03-25
Payer: COMMERCIAL

## 2023-03-25 PROCEDURE — 89310 SEMEN ANALYSIS W/COUNT: CPT

## 2023-03-28 ENCOUNTER — TELEPHONE (OUTPATIENT)
Dept: LAB | Facility: HOSPITAL | Age: 33
End: 2023-03-28

## 2023-03-28 DIAGNOSIS — Z30.2 ENCOUNTER FOR VASECTOMY: Primary | ICD-10-CM

## 2023-03-28 LAB
SPECIMEN VOL SMN: 1.5 ML
SPERM P VAS #/AREA SMN HPF: NORMAL /[HPF]

## 2024-06-03 ENCOUNTER — APPOINTMENT (OUTPATIENT)
Dept: OCCUPATIONAL MEDICINE | Facility: OTHER | Age: 34
End: 2024-06-03

## 2024-06-03 ENCOUNTER — APPOINTMENT (OUTPATIENT)
Dept: CHIROPRACTIC MEDICINE | Facility: OTHER | Age: 34
End: 2024-06-03

## 2024-06-03 PROCEDURE — 99080 SPECIAL REPORTS OR FORMS: CPT

## 2024-06-03 PROCEDURE — 99199 UNLISTED SPECIAL SVC PX/RPRT: CPT

## 2024-06-03 PROCEDURE — 94010 BREATHING CAPACITY TEST: CPT

## 2024-06-03 PROCEDURE — 99499 UNLISTED E&M SERVICE: CPT
